# Patient Record
Sex: FEMALE | Race: WHITE | Employment: FULL TIME | URBAN - METROPOLITAN AREA
[De-identification: names, ages, dates, MRNs, and addresses within clinical notes are randomized per-mention and may not be internally consistent; named-entity substitution may affect disease eponyms.]

---

## 2021-07-15 ENCOUNTER — OFFICE VISIT (OUTPATIENT)
Dept: INTERNAL MEDICINE CLINIC | Age: 26
End: 2021-07-15
Payer: COMMERCIAL

## 2021-07-15 VITALS
HEART RATE: 88 BPM | HEIGHT: 68 IN | BODY MASS INDEX: 31.37 KG/M2 | WEIGHT: 207 LBS | TEMPERATURE: 98.5 F | DIASTOLIC BLOOD PRESSURE: 80 MMHG | OXYGEN SATURATION: 98 % | RESPIRATION RATE: 16 BRPM | SYSTOLIC BLOOD PRESSURE: 122 MMHG

## 2021-07-15 DIAGNOSIS — F41.9 ANXIETY: ICD-10-CM

## 2021-07-15 DIAGNOSIS — F32.A DEPRESSION, UNSPECIFIED DEPRESSION TYPE: ICD-10-CM

## 2021-07-15 DIAGNOSIS — Z11.59 NEED FOR HEPATITIS C SCREENING TEST: ICD-10-CM

## 2021-07-15 DIAGNOSIS — E55.9 VITAMIN D DEFICIENCY: ICD-10-CM

## 2021-07-15 DIAGNOSIS — Z00.00 ENCOUNTER FOR MEDICAL EXAMINATION TO ESTABLISH CARE: Primary | ICD-10-CM

## 2021-07-15 PROCEDURE — 99203 OFFICE O/P NEW LOW 30 MIN: CPT | Performed by: INTERNAL MEDICINE

## 2021-07-15 RX ORDER — BUSPIRONE HYDROCHLORIDE 7.5 MG/1
7.5 TABLET ORAL DAILY
Qty: 90 TABLET | Refills: 1 | Status: SHIPPED | OUTPATIENT
Start: 2021-07-15 | End: 2022-01-07

## 2021-07-15 RX ORDER — PROPRANOLOL HYDROCHLORIDE 10 MG/1
10 TABLET ORAL AS NEEDED
COMMUNITY
End: 2021-07-15 | Stop reason: SDUPTHER

## 2021-07-15 RX ORDER — PROPRANOLOL HYDROCHLORIDE 10 MG/1
10 TABLET ORAL AS NEEDED
Qty: 60 TABLET | Refills: 0 | Status: SHIPPED | OUTPATIENT
Start: 2021-07-15 | End: 2021-09-07

## 2021-07-15 RX ORDER — BUSPIRONE HYDROCHLORIDE 7.5 MG/1
7.5 TABLET ORAL DAILY
COMMUNITY
Start: 2021-04-27 | End: 2021-07-15 | Stop reason: SDUPTHER

## 2021-07-15 NOTE — PROGRESS NOTES
HISTORY OF PRESENT ILLNESS  Pérez Nascimento is a 22 y.o. female. HPI Patient here to establish care. Has pmh of anxiety and depression. Reports being on Anti- anxiety medication twice a day since 2015. She takes Buspar everyday and Inderal prn. Reports doing fine taking antianxiety medication only once a day, may use inderal once in three months. She was low on her supplies on medication and reduced the dose of daily of Buspar to half a tablet/day, but reports return of anxiety symptoms and feels anxious all the time. Mom and her family has history of breast cancer. Patient has established care with OBGYN and reports having done a mamogram and pap smear last year. The results of which were normal.    Patient request refill on her medications. Review of Systems   Constitutional: Negative. HENT: Negative. Eyes: Negative. Respiratory: Negative. Cardiovascular: Negative. Gastrointestinal: Negative. Genitourinary: Negative. Musculoskeletal: Negative. Skin: Negative. Neurological: Negative. Endo/Heme/Allergies: Negative. Psychiatric/Behavioral: Negative. Physical Exam  Constitutional:       Appearance: Normal appearance. HENT:      Head: Normocephalic and atraumatic. Nose: Nose normal.      Mouth/Throat:      Mouth: Mucous membranes are moist.   Eyes:      Extraocular Movements: Extraocular movements intact. Cardiovascular:      Rate and Rhythm: Normal rate. Pulmonary:      Effort: Pulmonary effort is normal.   Abdominal:      General: Bowel sounds are normal.   Musculoskeletal:         General: Normal range of motion. Cervical back: Normal range of motion and neck supple. Skin:     General: Skin is warm. Neurological:      General: No focal deficit present. Mental Status: She is alert and oriented to person, place, and time.    Psychiatric:         Mood and Affect: Mood normal.         ASSESSMENT and PLAN  Diagnoses and all orders for this visit: 1. Encounter for medical examination to establish care    2. Anxiety  -     CBC WITH AUTOMATED DIFF; Future  -     METABOLIC PANEL, COMPREHENSIVE; Future  -     propranoloL (INDERAL) 10 mg tablet; Take 1 Tablet by mouth as needed for Anxiety. 3. Depression, unspecified depression type  -     busPIRone (BUSPAR) 7.5 mg tablet; Take 1 Tablet by mouth daily. 4. Need for hepatitis C screening test  -     HEPATITIS C AB; Future    5.  Vitamin D deficiency  -     VITAMIN D, 25 HYDROXY; Future      Follow-up and Dispositions    · Return in about 6 months (around 1/15/2022), or if symptoms worsen or fail to improve, for Follow up.       lab results and schedule of future lab studies reviewed with patient  reviewed diet, exercise and weight control  reviewed medications and side effects in detail

## 2021-07-15 NOTE — PROGRESS NOTES
ADVISED PATIENT OF THE FOLLOWING HEALTH MAINTAINCE DUE  Health Maintenance Due   Topic Date Due    Hepatitis C Screening  Never done    HPV Age 9Y-34Y (1 - 2-dose series) Never done    DTaP/Tdap/Td series (1 - Tdap) Never done    PAP AKA CERVICAL CYTOLOGY  Never done      Chief Complaint   Patient presents with    Establish Care    Depression    Anxiety       1. Have you been to the ER, urgent care clinic since your last visit? Hospitalized since your last visit? No    2. Have you seen or consulted any other health care providers outside of the [address] [place] [address] since your last visit? Include any DEXA scan, mammography  or colon screening. No    3. Do you have an Advance Directive on file? no    4. Do you have a DNR on file? no    Patient is accompanied by self I have received verbal consent from Fermin Tafoya to discuss any/all medical information while they are present in the room. No flowsheet data found. [id number] [address] [address]  Phone: [phone] Fax: [phone]        Patient reminded during visit to bring all medication bottles, OTC medications to all appointments.

## 2021-09-05 DIAGNOSIS — F41.9 ANXIETY: ICD-10-CM

## 2021-09-07 RX ORDER — PROPRANOLOL HYDROCHLORIDE 10 MG/1
10 TABLET ORAL AS NEEDED
Qty: 60 TABLET | Refills: 0 | Status: SHIPPED | OUTPATIENT
Start: 2021-09-07 | End: 2022-07-14

## 2022-01-07 DIAGNOSIS — F32.A DEPRESSION, UNSPECIFIED DEPRESSION TYPE: ICD-10-CM

## 2022-01-07 RX ORDER — BUSPIRONE HYDROCHLORIDE 7.5 MG/1
TABLET ORAL
Qty: 90 TABLET | Refills: 1 | Status: SHIPPED | OUTPATIENT
Start: 2022-01-07 | End: 2022-07-14

## 2022-03-18 PROBLEM — F32.A DEPRESSION: Status: ACTIVE | Noted: 2021-07-15

## 2022-03-19 PROBLEM — F41.9 ANXIETY: Status: ACTIVE | Noted: 2021-07-15

## 2022-07-14 ENCOUNTER — OFFICE VISIT (OUTPATIENT)
Dept: INTERNAL MEDICINE CLINIC | Age: 27
End: 2022-07-14
Payer: COMMERCIAL

## 2022-07-14 VITALS
WEIGHT: 193.4 LBS | RESPIRATION RATE: 18 BRPM | HEIGHT: 68 IN | OXYGEN SATURATION: 97 % | BODY MASS INDEX: 29.31 KG/M2 | DIASTOLIC BLOOD PRESSURE: 70 MMHG | HEART RATE: 91 BPM | TEMPERATURE: 98.4 F | SYSTOLIC BLOOD PRESSURE: 118 MMHG

## 2022-07-14 DIAGNOSIS — F41.9 ANXIETY: Primary | ICD-10-CM

## 2022-07-14 DIAGNOSIS — E55.9 VITAMIN D DEFICIENCY: ICD-10-CM

## 2022-07-14 DIAGNOSIS — Z00.00 WELL ADULT EXAM: ICD-10-CM

## 2022-07-14 PROCEDURE — 99213 OFFICE O/P EST LOW 20 MIN: CPT | Performed by: INTERNAL MEDICINE

## 2022-07-14 NOTE — PROGRESS NOTES
HISTORY OF PRESENT ILLNESS  Jenny Novoa is a 32 y.o. female. Patient was seen for a well exam. PMH of anxiety and depression. Patient ran out of her medication for anxiety. Feels ok without it and would like remain off for now. Will need some labs done. PAP was done in the last few months. Has a significant family history breast CA. Mammogram will be done at the age of 29. Reports no new concerns. Visit Vitals  /70 (BP 1 Location: Left upper arm, BP Patient Position: Sitting, BP Cuff Size: Adult)   Pulse 91   Temp 98.4 °F (36.9 °C) (Oral)   Resp 18   Ht 5' 8\" (1.727 m)   Wt 193 lb 6.4 oz (87.7 kg)   SpO2 97%   BMI 29.41 kg/m²     Past Medical History:   Diagnosis Date    Depression      Past Surgical History:   Procedure Laterality Date    HX HEENT       Family History   Problem Relation Age of Onset    Cancer Mother         breast cancer     Diabetes Father     Cancer Maternal Aunt         breast ca    Cancer Maternal Grandmother         breast ca     Outpatient Encounter Medications as of 7/14/2022   Medication Sig Dispense Refill    [DISCONTINUED] busPIRone (BUSPAR) 7.5 mg tablet TAKE 1 TABLET BY MOUTH EVERY DAY (Patient not taking: Reported on 7/14/2022) 90 Tablet 1    [DISCONTINUED] propranoloL (INDERAL) 10 mg tablet TAKE 1 TABLET BY MOUTH AS NEEDED FOR ANXIETY. (Patient not taking: Reported on 7/14/2022) 60 Tablet 0     No facility-administered encounter medications on file as of 7/14/2022. HPI    Review of Systems   All other systems reviewed and are negative. Physical Exam  Vitals and nursing note reviewed. Cardiovascular:      Rate and Rhythm: Normal rate and regular rhythm. Pulmonary:      Effort: Pulmonary effort is normal.      Breath sounds: Normal breath sounds. Abdominal:      General: Bowel sounds are normal.      Palpations: Abdomen is soft. Musculoskeletal:         General: Normal range of motion. Cervical back: Neck supple.    Skin:     General: Skin is warm.   Neurological:      Mental Status: She is alert and oriented to person, place, and time. Psychiatric:         Behavior: Behavior normal.         ASSESSMENT and PLAN  Diagnoses and all orders for this visit:    1. Anxiety    2. Well adult exam  -     METABOLIC PANEL, COMPREHENSIVE; Future  -     CBC WITH AUTOMATED DIFF; Future  -     LIPID PANEL; Future    3.  Vitamin D deficiency  -     VITAMIN D, 25 HYDROXY; Future      Follow-up and Dispositions    · Return in about 1 year (around 7/14/2023), or if symptoms worsen or fail to improve.       lab results and schedule of future lab studies reviewed with patient  reviewed diet, exercise and weight control  reviewed medications and side effects in detail

## 2022-07-14 NOTE — PROGRESS NOTES
Rm    Chief Complaint   Patient presents with    Physical        Visit Vitals  /70 (BP 1 Location: Left upper arm, BP Patient Position: Sitting, BP Cuff Size: Adult)   Pulse 91   Temp 98.4 °F (36.9 °C) (Oral)   Resp 18   Ht 5' 8\" (1.727 m)   Wt 193 lb 6.4 oz (87.7 kg)   SpO2 97%   BMI 29.41 kg/m²        1. Have you been to the ER, urgent care clinic since your last visit? Hospitalized since your last visit? No    2. Have you seen or consulted any other health care providers outside of the [address] [place] [address] since your last visit? Include any pap smears or colon screening. No     Health Maintenance Due   Topic Date Due    Hepatitis C Screening  Never done    HPV Age 9Y-34Y (1 - 2-dose series) Never done    Pap Smear  Never done    COVID-19 Vaccine (3 - Booster for Pfizer series) 09/30/2021    Depression Monitoring  07/15/2022        3 most recent PHQ Screens 7/14/2022   Little interest or pleasure in doing things Not at all   Feeling down, depressed, irritable, or hopeless Not at all   Total Score PHQ 2 0   Trouble falling or staying asleep, or sleeping too much -   Feeling tired or having little energy -   Poor appetite, weight loss, or overeating -   Feeling bad about yourself - or that you are a failure or have let yourself or your family down -   Trouble concentrating on things such as school, work, reading, or watching TV -   Moving or speaking so slowly that other people could have noticed; or the opposite being so fidgety that others notice -   Thoughts of being better off dead, or hurting yourself in some way -   PHQ 9 Score -        Fall Risk Assessment, last 12 mths 7/15/2021   Able to walk? Yes   Fall in past 12 months? 0   Do you feel unsteady? 0   Are you worried about falling 0       No flowsheet data found.

## 2023-01-26 ENCOUNTER — OFFICE VISIT (OUTPATIENT)
Dept: INTERNAL MEDICINE CLINIC | Age: 28
End: 2023-01-26
Payer: COMMERCIAL

## 2023-01-26 VITALS
DIASTOLIC BLOOD PRESSURE: 84 MMHG | OXYGEN SATURATION: 98 % | RESPIRATION RATE: 12 BRPM | TEMPERATURE: 98.1 F | HEIGHT: 68 IN | SYSTOLIC BLOOD PRESSURE: 124 MMHG | BODY MASS INDEX: 30.68 KG/M2 | WEIGHT: 202.4 LBS | HEART RATE: 82 BPM

## 2023-01-26 DIAGNOSIS — F41.9 ANXIETY: Primary | ICD-10-CM

## 2023-01-26 PROCEDURE — 99213 OFFICE O/P EST LOW 20 MIN: CPT | Performed by: INTERNAL MEDICINE

## 2023-01-26 NOTE — PROGRESS NOTES
ADVISED PATIENT OF THE FOLLOWING HEALTH MAINTAINCE DUE  Health Maintenance Due   Topic Date Due    Hepatitis C Screening  Never done    DTaP/Tdap/Td series (1 - Tdap) Never done    Pap Smear  Never done    COVID-19 Vaccine (3 - Booster for Pfizer series) 06/25/2021    Flu Vaccine (1) Never done      Chief Complaint   Patient presents with    Anxiety       1. \"Have you been to the ER, urgent care clinic since your last visit? Hospitalized since your last visit? \" No    2. \"Have you seen or consulted any other health care providers outside of the [address] [place] [address] since your last visit? \" No     3. For patients aged 39-70: Has the patient had a colonoscopy / FIT/ Cologuard? NA - based on age      If the patient is female:    4. For patients aged 41-77: Has the patient had a mammogram within the past 2 years? NA - based on age or sex      11. For patients aged 21-65: Has the patient had a pap smear? Yes - Care Gap present.  Rooming MA/LPN to request most recent results

## 2023-01-26 NOTE — PROGRESS NOTES
HISTORY OF PRESENT ILLNESS  Thea Mantilla is a 32 y.o. female. Patient presents with a complaint of anxiety. Reports \"little things send [her] over the edge. Anxiety increases while in the work setting. Stopped taking related medications in fall of 2022. Requests recommendation to see a therapist.   [address] reviewed with patient. Review of Systems   Constitutional: Negative. Respiratory: Negative. Cardiovascular: Negative. Gastrointestinal: Negative. Psychiatric/Behavioral:  Positive for depression. The patient is nervous/anxious. Visit Vitals  /84 (BP 1 Location: Left upper arm, BP Patient Position: Sitting, BP Cuff Size: Adult)   Pulse 82   Temp 98.1 °F (36.7 °C) (Oral)   Resp 12   Ht 5' 8\" (1.727 m)   Wt 202 lb 6.4 oz (91.8 kg)   LMP 01/23/2023 (Exact Date)   SpO2 98%   BMI 30.77 kg/m²      Past Medical History:   Diagnosis Date    Depression       No outpatient encounter medications on file as of 1/26/2023. No facility-administered encounter medications on file as of 1/26/2023. Physical Exam  Constitutional:       Appearance: Normal appearance. She is well-groomed. HENT:      Head: Normocephalic. Cardiovascular:      Rate and Rhythm: Normal rate and regular rhythm. Pulmonary:      Effort: Pulmonary effort is normal.      Breath sounds: Normal breath sounds. Abdominal:      General: Bowel sounds are normal.      Palpations: Abdomen is soft. Musculoskeletal:      Right lower leg: No edema. Left lower leg: No edema. Skin:     General: Skin is warm and dry. Capillary Refill: Capillary refill takes less than 2 seconds. Neurological:      Mental Status: She is alert and oriented to person, place, and time. Psychiatric:         Attention and Perception: Attention normal.         Mood and Affect: Mood is anxious. Behavior: Behavior is cooperative. ASSESSMENT and PLAN    Diagnoses and all orders for this visit:    1.  Anxiety     - Patient provided with a list of therapist recommendations. Follow-up and Dispositions    Return if symptoms worsen or fail to improve.

## 2023-07-11 ENCOUNTER — OFFICE VISIT (OUTPATIENT)
Age: 28
End: 2023-07-11
Payer: COMMERCIAL

## 2023-07-11 VITALS
HEIGHT: 68 IN | OXYGEN SATURATION: 98 % | WEIGHT: 216.8 LBS | HEART RATE: 78 BPM | BODY MASS INDEX: 32.86 KG/M2 | SYSTOLIC BLOOD PRESSURE: 130 MMHG | RESPIRATION RATE: 18 BRPM | TEMPERATURE: 98.8 F | DIASTOLIC BLOOD PRESSURE: 86 MMHG

## 2023-07-11 DIAGNOSIS — F41.9 ANXIETY: ICD-10-CM

## 2023-07-11 DIAGNOSIS — S69.91XA INJURY OF RIGHT INDEX FINGER, INITIAL ENCOUNTER: Primary | ICD-10-CM

## 2023-07-11 DIAGNOSIS — E55.9 VITAMIN D DEFICIENCY: ICD-10-CM

## 2023-07-11 DIAGNOSIS — Z01.89 ENCOUNTER FOR ROUTINE LABORATORY TESTING: ICD-10-CM

## 2023-07-11 DIAGNOSIS — R22.40 NODULE OF SKIN OF FOOT: ICD-10-CM

## 2023-07-11 PROCEDURE — 99213 OFFICE O/P EST LOW 20 MIN: CPT | Performed by: INTERNAL MEDICINE

## 2023-07-11 PROCEDURE — G8427 DOCREV CUR MEDS BY ELIG CLIN: HCPCS | Performed by: INTERNAL MEDICINE

## 2023-07-11 PROCEDURE — 1036F TOBACCO NON-USER: CPT | Performed by: INTERNAL MEDICINE

## 2023-07-11 PROCEDURE — G8417 CALC BMI ABV UP PARAM F/U: HCPCS | Performed by: INTERNAL MEDICINE

## 2023-07-11 ASSESSMENT — ENCOUNTER SYMPTOMS
GASTROINTESTINAL NEGATIVE: 1
RESPIRATORY NEGATIVE: 1

## 2023-07-12 NOTE — PROGRESS NOTES
Subjective    Frank Chance is a 32 y.o. female who presents today for the following: patient was seen for a follow up and initially a well exam.     Reports that she was doing an activity this weekend that she slammed her right hand down and her index finger got caught. No able to bend and it is tender. No numbness and tingling. Reports an area to her foot that is painful. Began as a flat area, and now is hard and increases with pain with standing. Will need follow up blood work. PAP done in the last year. She feels like anxiety is stable right now  Chief Complaint   Patient presents with    Annual Exam           PMH/PSH/Allergies/Social History/medication list and most recent studies reviewed with patient. reports that she has never smoked. She has never used smokeless tobacco.    reports current alcohol use of about 2.0 standard drinks per week. Vitals:    07/11/23 1439   BP: 130/86   Pulse:    Resp:    Temp:    SpO2:       Past Medical History:   Diagnosis Date    Depression        Allergies   Allergen Reactions    Sulfa Antibiotics Rash        No current outpatient medications on file prior to visit. No current facility-administered medications on file prior to visit. Review of Systems   Constitutional: Negative. Respiratory: Negative. Cardiovascular: Negative. Gastrointestinal: Negative. Musculoskeletal:         Finger pain   Neurological: Negative. Objective    Physical Exam  Vitals and nursing note reviewed. Cardiovascular:      Rate and Rhythm: Normal rate and regular rhythm. Pulmonary:      Effort: Pulmonary effort is normal.      Breath sounds: Normal breath sounds. Musculoskeletal:        Hands:         Feet:       Comments: Right finger tender, mild swelling, pulse present   Appears like a callus    Skin:     General: Skin is warm. Neurological:      Mental Status: She is alert and oriented to person, place, and time.    Psychiatric: